# Patient Record
Sex: FEMALE | URBAN - METROPOLITAN AREA
[De-identification: names, ages, dates, MRNs, and addresses within clinical notes are randomized per-mention and may not be internally consistent; named-entity substitution may affect disease eponyms.]

---

## 2020-09-24 ENCOUNTER — APPOINTMENT (RX ONLY)
Dept: URBAN - METROPOLITAN AREA CLINIC 142 | Facility: CLINIC | Age: 45
Setting detail: DERMATOLOGY
End: 2020-09-24

## 2020-09-24 DIAGNOSIS — L81.1 CHLOASMA: ICD-10-CM

## 2020-09-24 PROCEDURE — ? IN-HOUSE DISPENSING PHARMACY

## 2020-09-24 PROCEDURE — 99202 OFFICE O/P NEW SF 15 MIN: CPT

## 2020-09-24 PROCEDURE — ? TREATMENT REGIMEN

## 2020-09-24 PROCEDURE — ? COUNSELING

## 2020-09-24 ASSESSMENT — LOCATION DETAILED DESCRIPTION DERM
LOCATION DETAILED: RIGHT CENTRAL MALAR CHEEK
LOCATION DETAILED: LEFT CENTRAL MALAR CHEEK

## 2020-09-24 ASSESSMENT — LOCATION SIMPLE DESCRIPTION DERM
LOCATION SIMPLE: RIGHT CHEEK
LOCATION SIMPLE: LEFT CHEEK

## 2020-09-24 ASSESSMENT — LOCATION ZONE DERM: LOCATION ZONE: FACE

## 2020-09-24 NOTE — PROCEDURE: IN-HOUSE DISPENSING PHARMACY
Product 62 Units Dispensed: 0
Product 70 Unit Type: mg
Product 2 Units Dispensed: 1
Product 4 Refills: 11
Render Product Pricing In Note: Yes
Name Of Product 2: Melasma Emulsion (hydroquinone/tretinoin/kojic acid)
Product 3 Amount/Unit (Numbers Only): 30
Product 4 Application Directions: apply to affected area(s) once to twice daily as needed
Product 3 Refills: 2
Product 2 Application Directions: apply to affected area(s) for 3 months on, 1 month off, repeat as needed
Product 3 Application Directions: apply to affected areas every morning
Detail Level: Zone
Name Of Product 1: Dr Harpal Moon
Product 1 Application Directions: apply to lips twice a day as needed
Name Of Product 3: Rosacea Gel with Brimonidine (metronidazole/ivermectin/azelaic acid/brimonidine)
Name Of Product 4: Tacrolimus 0.1% Ointment
Product 1 Unit Type: tube(s)

## 2020-09-24 NOTE — PROCEDURE: MIPS QUALITY
Detail Level: Detailed
Quality 154 Part A: Falls: Risk Assessment (Should Be Reported With Measure 155.): Falls risk assessment completed and documented in the past 12 months.
Quality 431: Preventive Care And Screening: Unhealthy Alcohol Use - Screening: Patient screened for unhealthy alcohol use using a single question and scores less than 2 times per year
Quality 226: Preventive Care And Screening: Tobacco Use: Screening And Cessation Intervention: Patient screened for tobacco use and is an ex/non-smoker
Quality 47: Advance Care Plan: Advance care planning not documented, reason not otherwise specified.
Quality 110: Preventive Care And Screening: Influenza Immunization: Influenza Immunization Administered during Influenza season
Quality 111:Pneumonia Vaccination Status For Older Adults: Pneumococcal Vaccination not Administered or Previously Received, Reason not Otherwise Specified
Quality 154 Part B: Falls: Risk Screening (Should Be Reported With Measure 155.): Patient screened for future fall risk; documentation of no falls in the past year or only one fall without injury in the past year
Quality 155 (Denominator): Falls Plan Of Care: Plan of Care not Documented, Reason not Otherwise Specified

## 2020-09-24 NOTE — PROCEDURE: TREATMENT REGIMEN
Initiate Treatment: Melasma emulsion - apply to affected areas at bedtime for 3 months then take 1 month off, repeat as needed
Detail Level: Detailed